# Patient Record
Sex: MALE | Employment: OTHER
[De-identification: names, ages, dates, MRNs, and addresses within clinical notes are randomized per-mention and may not be internally consistent; named-entity substitution may affect disease eponyms.]

---

## 2024-08-14 ENCOUNTER — TRANSCRIBE ORDERS (OUTPATIENT)
Dept: OTHER | Age: 59
End: 2024-08-14

## 2024-08-14 DIAGNOSIS — D35.2 PITUITARY ADENOMA (H): Primary | ICD-10-CM

## 2024-08-16 ENCOUNTER — CARE COORDINATION (OUTPATIENT)
Dept: NEUROSURGERY | Facility: CLINIC | Age: 59
End: 2024-08-16

## 2024-08-16 DIAGNOSIS — D35.2 PITUITARY ADENOMA (H): Primary | ICD-10-CM

## 2024-08-19 ENCOUNTER — TELEPHONE (OUTPATIENT)
Dept: NEUROSURGERY | Facility: CLINIC | Age: 59
End: 2024-08-19

## 2024-08-19 NOTE — TELEPHONE ENCOUNTER
Left message for patient to call back [phone] and schedule with either Dr. Ascencio or Dr. Marshall with referral for Pituitary Adenoma.    Pt to complete ordered labs prior to appointment.

## 2024-08-20 NOTE — TELEPHONE ENCOUNTER
[facility] Call Center    Phone Message    May a detailed message be left on voicemail: yes     Reason for Call: Other: Patient does not want to schedule an appointment in the Neurosurgery clinic until he can get some results. He said he has been waiting 2 months and nobody has called him. He is not spending any more money to see anymore doctors until  he gets answers.      Action Taken: Message routed to:  [facility] ([facility]):  Neurosurgery     Travel Screening: Not Applicable     Date of Service:

## 2024-08-21 NOTE — TELEPHONE ENCOUNTER
"Patient referred to our clinic for Pituitary Adenoma from [facility]. We have never seen patient before, if he has questions/concerns before seeing someone on our team he should review with referring provider.     Called patient to discuss. Reviewed above information. Patient very upset with this recommendation. Patient does not understand \"why a doctor cannot just call him to review imaging and tell him what he needs to do\". Reviewed multiple times he is a new patient and we need to see him in clinic for imaging review and to discuss recommendations moving forward.     Patient reports he will not see another doctor until someone calls him and tells him what is going on. Reviewed any questions prior to seeing us need to be directed to his referring provider. Patient reports \"Dr. Echeverria wont answer my calls\".     Per chart review Jacki Stockton PA-C is who patient is referring to.     Patient then reports \"whatever is in my body I guess I will just live with since no one will call me\".     Called Jacki Stockton PA-C office to discuss, spoke with  team. A message with above information is being sent to provider for review. Requesting referring provider reach out to patient to further discuss.       "